# Patient Record
Sex: FEMALE | Race: WHITE | Employment: FULL TIME | URBAN - METROPOLITAN AREA
[De-identification: names, ages, dates, MRNs, and addresses within clinical notes are randomized per-mention and may not be internally consistent; named-entity substitution may affect disease eponyms.]

---

## 2017-05-17 ENCOUNTER — HOSPITAL ENCOUNTER (OUTPATIENT)
Dept: ULTRASOUND IMAGING | Age: 67
Discharge: HOME | End: 2017-05-17
Attending: INTERNAL MEDICINE | Admitting: INTERNAL MEDICINE

## 2017-05-17 DIAGNOSIS — E04.1 NONTOXIC UNINODULAR GOITER: ICD-10-CM

## 2017-05-17 DIAGNOSIS — E04.1 NONTOXIC SINGLE THYROID NODULE: ICD-10-CM

## 2021-06-30 ENCOUNTER — HOSPITAL ENCOUNTER (OUTPATIENT)
Age: 71
Discharge: HOME | End: 2021-06-30
Payer: MEDICARE

## 2021-06-30 ENCOUNTER — OFFICE VISIT (OUTPATIENT)
Dept: ORTHOPEDIC SURGERY | Age: 71
End: 2021-06-30
Payer: MEDICARE

## 2021-06-30 VITALS — WEIGHT: 155 LBS | BODY MASS INDEX: 26.46 KG/M2 | HEIGHT: 64 IN

## 2021-06-30 DIAGNOSIS — M79.672 LEFT FOOT PAIN: ICD-10-CM

## 2021-06-30 DIAGNOSIS — M65.9 SYNOVITIS OF TOE: ICD-10-CM

## 2021-06-30 DIAGNOSIS — M79.672 LEFT FOOT PAIN: Primary | ICD-10-CM

## 2021-06-30 LAB
BASOPHILS ABSOLUTE: 0 K/UL (ref 0–0.2)
BASOPHILS RELATIVE PERCENT: 0.4 %
EOSINOPHILS ABSOLUTE: 0.1 K/UL (ref 0–0.6)
EOSINOPHILS RELATIVE PERCENT: 0.9 %
HCT VFR BLD CALC: 42.1 % (ref 36–48)
HEMOGLOBIN: 14.4 G/DL (ref 12–16)
LYMPHOCYTES ABSOLUTE: 1.5 K/UL (ref 1–5.1)
LYMPHOCYTES RELATIVE PERCENT: 19.6 %
MCH RBC QN AUTO: 30.5 PG (ref 26–34)
MCHC RBC AUTO-ENTMCNC: 34.2 G/DL (ref 31–36)
MCV RBC AUTO: 89.3 FL (ref 80–100)
MONOCYTES ABSOLUTE: 0.5 K/UL (ref 0–1.3)
MONOCYTES RELATIVE PERCENT: 6.9 %
NEUTROPHILS ABSOLUTE: 5.5 K/UL (ref 1.7–7.7)
NEUTROPHILS RELATIVE PERCENT: 72.2 %
PDW BLD-RTO: 13 % (ref 12.4–15.4)
PLATELET # BLD: 176 K/UL (ref 135–450)
PMV BLD AUTO: 10.3 FL (ref 5–10.5)
RBC # BLD: 4.71 M/UL (ref 4–5.2)
URIC ACID, SERUM: 4 MG/DL (ref 2.6–6)
WBC # BLD: 7.6 K/UL (ref 4–11)

## 2021-06-30 PROCEDURE — 84550 ASSAY OF BLOOD/URIC ACID: CPT

## 2021-06-30 PROCEDURE — 1090F PRES/ABSN URINE INCON ASSESS: CPT | Performed by: FAMILY MEDICINE

## 2021-06-30 PROCEDURE — 99203 OFFICE O/P NEW LOW 30 MIN: CPT | Performed by: FAMILY MEDICINE

## 2021-06-30 PROCEDURE — 3017F COLORECTAL CA SCREEN DOC REV: CPT | Performed by: FAMILY MEDICINE

## 2021-06-30 PROCEDURE — G8400 PT W/DXA NO RESULTS DOC: HCPCS | Performed by: FAMILY MEDICINE

## 2021-06-30 PROCEDURE — 36415 COLL VENOUS BLD VENIPUNCTURE: CPT

## 2021-06-30 PROCEDURE — G8417 CALC BMI ABV UP PARAM F/U: HCPCS | Performed by: FAMILY MEDICINE

## 2021-06-30 PROCEDURE — 85025 COMPLETE CBC W/AUTO DIFF WBC: CPT

## 2021-06-30 PROCEDURE — L3260 AMBULATORY SURGICAL BOOT EAC: HCPCS | Performed by: FAMILY MEDICINE

## 2021-06-30 PROCEDURE — 4040F PNEUMOC VAC/ADMIN/RCVD: CPT | Performed by: FAMILY MEDICINE

## 2021-06-30 PROCEDURE — 1123F ACP DISCUSS/DSCN MKR DOCD: CPT | Performed by: FAMILY MEDICINE

## 2021-06-30 PROCEDURE — G8427 DOCREV CUR MEDS BY ELIG CLIN: HCPCS | Performed by: FAMILY MEDICINE

## 2021-06-30 PROCEDURE — 4004F PT TOBACCO SCREEN RCVD TLK: CPT | Performed by: FAMILY MEDICINE

## 2021-06-30 RX ORDER — METHYLPREDNISOLONE 4 MG/1
TABLET ORAL
Qty: 21 KIT | Refills: 0 | Status: SHIPPED | OUTPATIENT
Start: 2021-06-30 | End: 2021-07-21 | Stop reason: ALTCHOICE

## 2021-06-30 RX ORDER — DICLOFENAC SODIUM 75 MG/1
75 TABLET, DELAYED RELEASE ORAL 2 TIMES DAILY
Qty: 60 TABLET | Refills: 3 | Status: SHIPPED | OUTPATIENT
Start: 2021-06-30

## 2021-07-02 ENCOUNTER — TELEPHONE (OUTPATIENT)
Dept: ORTHOPEDIC SURGERY | Age: 71
End: 2021-07-02

## 2021-07-02 NOTE — TELEPHONE ENCOUNTER
SPOKE TO PATIENT AND CONVEYED LAB RESULTS. NORMAL LEVELS. INSTRUCTED HER TO FINISH STEROID PACK, CONTINUE WITH POST OP SHOE AND THEN SWITCH OVER TO DICLOFENAC 2X/DAY UNTIL SHE IS SEEN BACK IN THE OFFICE.

## 2021-07-21 ENCOUNTER — OFFICE VISIT (OUTPATIENT)
Dept: ORTHOPEDIC SURGERY | Age: 71
End: 2021-07-21
Payer: MEDICARE

## 2021-07-21 DIAGNOSIS — M79.672 LEFT FOOT PAIN: Primary | ICD-10-CM

## 2021-07-21 DIAGNOSIS — M65.9 SYNOVITIS OF TOE: ICD-10-CM

## 2021-07-21 PROCEDURE — 4004F PT TOBACCO SCREEN RCVD TLK: CPT | Performed by: FAMILY MEDICINE

## 2021-07-21 PROCEDURE — 3017F COLORECTAL CA SCREEN DOC REV: CPT | Performed by: FAMILY MEDICINE

## 2021-07-21 PROCEDURE — 99213 OFFICE O/P EST LOW 20 MIN: CPT | Performed by: FAMILY MEDICINE

## 2021-07-21 PROCEDURE — 4040F PNEUMOC VAC/ADMIN/RCVD: CPT | Performed by: FAMILY MEDICINE

## 2021-07-21 PROCEDURE — G8400 PT W/DXA NO RESULTS DOC: HCPCS | Performed by: FAMILY MEDICINE

## 2021-07-21 PROCEDURE — G8427 DOCREV CUR MEDS BY ELIG CLIN: HCPCS | Performed by: FAMILY MEDICINE

## 2021-07-21 PROCEDURE — 1090F PRES/ABSN URINE INCON ASSESS: CPT | Performed by: FAMILY MEDICINE

## 2021-07-21 PROCEDURE — G8417 CALC BMI ABV UP PARAM F/U: HCPCS | Performed by: FAMILY MEDICINE

## 2021-07-21 PROCEDURE — 1123F ACP DISCUSS/DSCN MKR DOCD: CPT | Performed by: FAMILY MEDICINE

## 2021-07-21 NOTE — PROGRESS NOTES
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 Barbara Vargas who is being seen today upon self-referral for evaluation of acute onset of pain to her left medial forefoot. There is no history of actual injury no activity but her pain symptoms started as she was walking through the airport after returning from weeks vacation in [facility]. 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 She has had pain as well as some swelling and erythematous change to the MTP joint is having a difficult time toeing off. There is no history of previous injury and she did have some relatively asymptomatic early bunion and hallux valgus notable but has had swelling over the first MTP joint. Pain is ranging substantially between a 4 up to about a 10 on a 10 to the point where she is actively limping and there is a constant throb. She has never been worked up or told that she had gout. She has taken some ibuprofen in subtherapeutic dosings which does give some temporary improvement and iced with some improvements of her swelling. Due to limited ability to walk and some residual pain at night, she is being seen today for orthopedic and sports consultation with imaging. Rufina Oliveira was initially evaluated in the office on 6/30/2021 after returning from vacation to [facility] with pain to the left first MTP joint with limited ability to bear weight. Her x-rays did reveal some low-grade midfoot arthropathy and first MTP arthropathy and was tentatively diagnosed with synovitis.   We did send blood work for uric acid and CBC which were all normal.  Following her Medrol dose pack a few days using a postop shoe and anti-inflammatories her symptoms did resolve with improvements in her swelling. Since that time she has been effectively asymptomatic and was actually able to go to a conference for [address] [place] [address] in [facility] for entire week and was walking 5 to 10 miles without any type of recurrent pain. She is very pleased with her progress. Medical History  Patient's medications, allergies, past medical, surgical, social and family histories were reviewed and updated as appropriate. Review of Systems  Pertinent items are noted in HPI  Review of systems reviewed from Patient History Form dated on 6/30/2021 and available in the patient's chart under the Media tab. Vital Signs  There were no vitals filed for this visit. General Exam:     Constitutional: Patient is adequately groomed with no evidence of malnutrition  DTRs: Deep tendon reflexes are intact  Mental Status: The patient is oriented to time, place and person. The patient's mood and affect are appropriate. Lymphatic: The lymphatic examination bilaterally reveals all areas to be without enlargement or induration. Vascular: Examination reveals no swelling or calf tenderness. Peripheral pulses are palpable and 2+. Neurological: The patient has good coordination. There is no weakness or sensory deficit. Foot  Examination  Inspection: There is no further swelling over the first MTP joint out evidence of erythematous change. She does appear to have some mild early bunion formation with arthritic changes well. Palpation: She is effectively down tender over the first MTP joint and the minimal soreness with MTP grind testing was noted. He does not feel this with active walking. Rang of Motion: Actively normal first MTP and midfoot motion. Strength: Flexor and extensor tendon function appears to be intact. Special Tests:  Only minimal soreness at 0-1 out of 10 pain reproduced with left foot first MTP grind testing. No evidence of instability. No sesamoid tenderness or metatarsal shaft tenderness. Skin: There are no rashes, ulcerations or lesions. Distal motor sensory and vascular them appears intact. Gait: Smooth gait with slight overpronation. Reflex strictly preserved. Additional Comments:     Additional Examinations:  Contralateral Exam: Contralateral right foot exam outside of essentially asymptomatic of bunion formation was noted. Right Lower Extremity: Examination of the right lower extremity does not show any tenderness, deformity or injury. Range of motion is unremarkable. There is no gross instability. There are no rashes, ulcerations or lesions. Strength and tone are normal.  Left Lower Extremity: Examination of the left lower extremity does not show any tenderness, deformity or injury. Range of motion is unremarkable. There is no gross instability. There are no rashes, ulcerations or lesions. Strength and tone are normal.      Diagnostic Test Findings:    Radiology:       Left foot AP lateral oblique films were reviewed from 6/30/2021 and does show mild degenerative changes over the first MTP joint without evidence of acute osseous injury. Assessment : #1. Nearly 4 weeks status post symptomatically improved left first MTP synovitis with resolved foot pain and low-grade first MTP arthropathy/low-grade midfoot arthropathy    Impression:  Encounter Diagnoses   Name Primary?  Left foot pain Yes    Synovitis of toe        Office Procedures:  No orders of the defined types were placed in this encounter. Treatment Plan: Treatment options were discussed with Cholo Montero today. We did once again review her plain films and exam findings. She is clinically outstanding and subjectively 100% improved. She is no longer having any pain swelling or erythema to the first MTP joint.   She has been able to transition out of her postop shoe and her initial gout work-up was negative for gouty arthropathy her elevated uric acid. Her symptoms have completely resolved and will work on motion exercises. She is not requiring any medications and has been walking her normal 4 to 5 miles daily without recurrent symptoms. She does have a mildly planus foot  and we discussed off-the-shelf arches and appropriate footwear. With her improvement I think we can see her back as needed but she was encouraged to contact us with questions or concerns. This dictation was performed with a verbal recognition program (DRAGON) and it was checked for errors. It is possible that there are still dictated errors within this office note. If so, please bring any errors to my attention for an addendum. All efforts were made to ensure that this office note is accurate.